# Patient Record
Sex: FEMALE | Race: OTHER | Employment: FULL TIME | URBAN - METROPOLITAN AREA
[De-identification: names, ages, dates, MRNs, and addresses within clinical notes are randomized per-mention and may not be internally consistent; named-entity substitution may affect disease eponyms.]

---

## 2018-07-14 VITALS
DIASTOLIC BLOOD PRESSURE: 82 MMHG | BODY MASS INDEX: 25.03 KG/M2 | SYSTOLIC BLOOD PRESSURE: 120 MMHG | HEIGHT: 62 IN | TEMPERATURE: 98 F | RESPIRATION RATE: 12 BRPM | HEART RATE: 69 BPM | WEIGHT: 136 LBS

## 2018-09-27 ENCOUNTER — OFFICE VISIT (OUTPATIENT)
Dept: PRIMARY CARE CLINIC | Age: 33
End: 2018-09-27
Payer: COMMERCIAL

## 2018-09-27 VITALS
HEART RATE: 69 BPM | SYSTOLIC BLOOD PRESSURE: 120 MMHG | RESPIRATION RATE: 14 BRPM | WEIGHT: 139 LBS | BODY MASS INDEX: 25.58 KG/M2 | DIASTOLIC BLOOD PRESSURE: 70 MMHG | HEIGHT: 62 IN

## 2018-09-27 DIAGNOSIS — N64.4 BREAST PAIN, RIGHT: Primary | ICD-10-CM

## 2018-09-27 PROCEDURE — 99213 OFFICE O/P EST LOW 20 MIN: CPT | Performed by: NURSE PRACTITIONER

## 2018-09-27 ASSESSMENT — PATIENT HEALTH QUESTIONNAIRE - PHQ9
SUM OF ALL RESPONSES TO PHQ QUESTIONS 1-9: 0
SUM OF ALL RESPONSES TO PHQ QUESTIONS 1-9: 0
SUM OF ALL RESPONSES TO PHQ9 QUESTIONS 1 & 2: 0
2. FEELING DOWN, DEPRESSED OR HOPELESS: 0
1. LITTLE INTEREST OR PLEASURE IN DOING THINGS: 0

## 2018-09-27 ASSESSMENT — ENCOUNTER SYMPTOMS
CONSTIPATION: 0
DIARRHEA: 0
SHORTNESS OF BREATH: 0
NAUSEA: 0
CHEST TIGHTNESS: 0
ABDOMINAL PAIN: 0
COUGH: 0

## 2018-09-27 NOTE — PROGRESS NOTES
Amarilis House was counseled regarding symptoms of current diagnosis, course and complications of disease if inadequately treated, side effects of medications, diagnosis, treatment options, and prognosis, risks, benefits, complications, and alternatives of treatment, labs, imaging and other studies and treatment targets and goals. She understands instructions and counseling. This dictation was performed with a verbal recognition program (DRAGON) and it was checked for errors. It is possible that there are still dictated errors within this office note. If so, please bring any errors to my attention for an addendum. All efforts were made to ensure that this office note is accurate.

## 2018-10-04 ENCOUNTER — HOSPITAL ENCOUNTER (OUTPATIENT)
Dept: WOMENS IMAGING | Age: 33
Discharge: HOME | End: 2018-10-04
Payer: COMMERCIAL

## 2018-10-04 ENCOUNTER — HOSPITAL ENCOUNTER (OUTPATIENT)
Dept: ULTRASOUND IMAGING | Age: 33
Discharge: HOME | End: 2018-10-04
Payer: COMMERCIAL

## 2018-10-04 DIAGNOSIS — N64.4 BREAST PAIN, RIGHT: ICD-10-CM

## 2018-10-04 DIAGNOSIS — N63.0 LUMP OR MASS IN BREAST: ICD-10-CM

## 2018-10-04 DIAGNOSIS — R92.8 ABNORMAL MAMMOGRAM: ICD-10-CM

## 2018-10-04 LAB — HCG(URINE) PREGNANCY TEST: NEGATIVE

## 2018-10-04 PROCEDURE — 76642 ULTRASOUND BREAST LIMITED: CPT

## 2018-10-04 PROCEDURE — 84703 CHORIONIC GONADOTROPIN ASSAY: CPT

## 2018-10-04 PROCEDURE — G0279 TOMOSYNTHESIS, MAMMO: HCPCS

## 2019-04-04 ENCOUNTER — TELEPHONE (OUTPATIENT)
Dept: PRIMARY CARE CLINIC | Age: 34
End: 2019-04-04

## 2019-04-04 DIAGNOSIS — R92.8 ABNORMAL MAMMOGRAM OF BOTH BREASTS: ICD-10-CM

## 2019-04-04 DIAGNOSIS — N63.0 BREAST MASS: Primary | ICD-10-CM

## 2019-04-04 NOTE — TELEPHONE ENCOUNTER
Pt called requesting order for her follow up left breast ultrasound to be put in the system    Only the right breast is currently ordered, states she needs the left as well   Please advise

## 2019-04-10 ENCOUNTER — HOSPITAL ENCOUNTER (OUTPATIENT)
Dept: ULTRASOUND IMAGING | Age: 34
Discharge: HOME | End: 2019-04-10
Payer: COMMERCIAL

## 2019-04-10 DIAGNOSIS — R92.8 ABNORMAL MAMMOGRAM OF BOTH BREASTS: ICD-10-CM

## 2019-04-10 DIAGNOSIS — N63.0 BREAST MASS: ICD-10-CM

## 2019-04-10 PROCEDURE — 76642 ULTRASOUND BREAST LIMITED: CPT

## 2023-04-21 ENCOUNTER — HOSPITAL ENCOUNTER (OUTPATIENT)
Dept: WOMENS IMAGING | Age: 38
Discharge: HOME | End: 2023-04-21
Payer: COMMERCIAL

## 2023-04-21 VITALS — WEIGHT: 142 LBS | BODY MASS INDEX: 26.81 KG/M2 | HEIGHT: 61 IN

## 2023-04-21 DIAGNOSIS — Z12.31 VISIT FOR SCREENING MAMMOGRAM: ICD-10-CM

## 2023-04-21 PROCEDURE — 77063 BREAST TOMOSYNTHESIS BI: CPT

## 2024-04-23 ENCOUNTER — TELEPHONE (OUTPATIENT)
Dept: WOMENS IMAGING | Age: 39
End: 2024-04-23

## 2024-04-25 ENCOUNTER — HOSPITAL ENCOUNTER (OUTPATIENT)
Dept: WOMENS IMAGING | Age: 39
Discharge: HOME | End: 2024-04-25
Payer: COMMERCIAL

## 2024-04-25 VITALS — HEIGHT: 57 IN | WEIGHT: 153 LBS | BODY MASS INDEX: 33.01 KG/M2

## 2024-04-25 DIAGNOSIS — Z12.31 VISIT FOR SCREENING MAMMOGRAM: ICD-10-CM

## 2024-04-25 PROCEDURE — 77063 BREAST TOMOSYNTHESIS BI: CPT
